# Patient Record
Sex: MALE | Race: WHITE | NOT HISPANIC OR LATINO | URBAN - METROPOLITAN AREA
[De-identification: names, ages, dates, MRNs, and addresses within clinical notes are randomized per-mention and may not be internally consistent; named-entity substitution may affect disease eponyms.]

---

## 2020-01-14 ENCOUNTER — OFFICE (OUTPATIENT)
Dept: URBAN - METROPOLITAN AREA CLINIC 33 | Facility: CLINIC | Age: 30
End: 2020-01-14

## 2020-01-14 VITALS
HEART RATE: 62 BPM | DIASTOLIC BLOOD PRESSURE: 75 MMHG | SYSTOLIC BLOOD PRESSURE: 117 MMHG | WEIGHT: 153 LBS | TEMPERATURE: 97.3 F | HEIGHT: 75 IN

## 2020-01-14 DIAGNOSIS — R63.4 ABNORMAL WEIGHT LOSS: ICD-10-CM

## 2020-01-14 DIAGNOSIS — R11.0 NAUSEA: ICD-10-CM

## 2020-01-14 DIAGNOSIS — R10.13 EPIGASTRIC PAIN: ICD-10-CM

## 2020-01-14 PROCEDURE — 99204 OFFICE O/P NEW MOD 45 MIN: CPT

## 2020-01-14 RX ORDER — PANTOPRAZOLE SODIUM 40 MG/1
TABLET, DELAYED RELEASE ORAL
Qty: 30 | Refills: 6 | Status: COMPLETED
End: 2020-08-03

## 2020-01-14 NOTE — SERVICEHPINOTES
FOREIGN MORALES   is a   29   year old male who is being seen in consultation at the request of   ARAVIND MATHEW   for nausea, weight loss, and intermittent abdominal cramping. He states symptoms are going on for past few years since 2015/2016. He has lost about 30 lbs since. He gets nausea if he eats too much and it will usually subside in an hour or two. The cramping is intermittent/rare. No abdominal pain with greasy food. He denies any vomiting. He sometimes will have diarrhea when he has nausea. He notes constipation at times but he is not sure if it related to reduced po intake at times. He notes heartburn at times and takes pepto bismol for symptomatic relief. He has tried PPI for 2 weeks and he was feeling better while on that. He denies any dysphagia. He has had dark stools associated with pepto intake. He denies any bright red blood per rectum. He has had endoscopic workup in the past for epigastric pain and nausea. He has a history of gastric erosions.  Of note, he had a CT Abdomen with Dr. Mathew which he states was unremarkable.

## 2020-01-21 ENCOUNTER — OFFICE (OUTPATIENT)
Dept: URBAN - METROPOLITAN AREA CLINIC 32 | Facility: CLINIC | Age: 30
End: 2020-01-21
Payer: COMMERCIAL

## 2020-01-21 VITALS
HEIGHT: 75 IN | SYSTOLIC BLOOD PRESSURE: 89 MMHG | RESPIRATION RATE: 16 BRPM | TEMPERATURE: 97.9 F | HEART RATE: 77 BPM | SYSTOLIC BLOOD PRESSURE: 95 MMHG | OXYGEN SATURATION: 98 % | DIASTOLIC BLOOD PRESSURE: 64 MMHG | DIASTOLIC BLOOD PRESSURE: 69 MMHG | OXYGEN SATURATION: 99 % | HEART RATE: 71 BPM | SYSTOLIC BLOOD PRESSURE: 102 MMHG | RESPIRATION RATE: 10 BRPM | HEART RATE: 64 BPM | HEART RATE: 73 BPM | SYSTOLIC BLOOD PRESSURE: 98 MMHG | OXYGEN SATURATION: 100 % | DIASTOLIC BLOOD PRESSURE: 57 MMHG | SYSTOLIC BLOOD PRESSURE: 87 MMHG | WEIGHT: 153 LBS | DIASTOLIC BLOOD PRESSURE: 51 MMHG | TEMPERATURE: 98.1 F | DIASTOLIC BLOOD PRESSURE: 55 MMHG

## 2020-01-21 DIAGNOSIS — R10.13 EPIGASTRIC PAIN: ICD-10-CM

## 2020-01-21 DIAGNOSIS — T18.2XXA FOREIGN BODY IN STOMACH, INITIAL ENCOUNTER: ICD-10-CM

## 2020-01-21 DIAGNOSIS — R63.4 ABNORMAL WEIGHT LOSS: ICD-10-CM

## 2020-01-21 DIAGNOSIS — K29.60 OTHER GASTRITIS WITHOUT BLEEDING: ICD-10-CM

## 2020-01-21 DIAGNOSIS — R11.0 NAUSEA: ICD-10-CM

## 2020-01-21 PROBLEM — K29.70 GASTRITIS, UNSPECIFIED, WITHOUT BLEEDING: Status: ACTIVE | Noted: 2020-01-21

## 2020-01-21 LAB
GI UPPER EGD HISOLOGY - SPECM 1 JAR(S): 4: (no result)
GI UPPER EGD HISOLOGY - SPECM 1 JAR(S): 4: PDF REPORT: (no result)

## 2020-01-21 PROCEDURE — 43239 EGD BIOPSY SINGLE/MULTIPLE: CPT

## 2020-06-19 ENCOUNTER — OFFICE (OUTPATIENT)
Dept: URBAN - METROPOLITAN AREA TELEHEALTH 3 | Facility: TELEHEALTH | Age: 30
End: 2020-06-19
Payer: COMMERCIAL

## 2020-06-19 VITALS — WEIGHT: 158 LBS | HEIGHT: 75 IN

## 2020-06-19 DIAGNOSIS — R11.0 NAUSEA: ICD-10-CM

## 2020-06-19 DIAGNOSIS — R10.9 UNSPECIFIED ABDOMINAL PAIN: ICD-10-CM

## 2020-06-19 DIAGNOSIS — R63.4 ABNORMAL WEIGHT LOSS: ICD-10-CM

## 2020-06-19 PROCEDURE — 99213 OFFICE O/P EST LOW 20 MIN: CPT | Mod: 95 | Performed by: INTERNAL MEDICINE

## 2020-06-19 NOTE — SERVICEHPINOTES
PATIENT VERIFIED BY DATE OF BIRTH AND NAME. Patient has been consented for this telecommunication visit. He was started on PPI and has been doing well since. Nausea has gone away for the most part. He has regained about 8 lbs since his lowest point (previous weight was 185 then as low as 150 lbs, now up to 158 lbs).  He has not had any abdominal pain. No vomiting, melena, rectal bleeding, diarrhea. Notes occasional constipation. His EGD revealed gastritis and food in the stomach. Follow up gastric emptying study was unremarkable.  Overall he has been doing better. Starting to lift weights again and is trying to eat more, at least 1800 to 2400 calories. He is wondering how for how long he has to take PPI.

## 2020-08-03 ENCOUNTER — OFFICE (OUTPATIENT)
Dept: URBAN - METROPOLITAN AREA TELEHEALTH 12 | Facility: TELEHEALTH | Age: 30
End: 2020-08-03
Payer: COMMERCIAL

## 2020-08-03 VITALS — HEIGHT: 75 IN | WEIGHT: 158 LBS

## 2020-08-03 DIAGNOSIS — R11.0 NAUSEA: ICD-10-CM

## 2020-08-03 DIAGNOSIS — K30 FUNCTIONAL DYSPEPSIA: ICD-10-CM

## 2020-08-03 DIAGNOSIS — R63.4 ABNORMAL WEIGHT LOSS: ICD-10-CM

## 2020-08-03 PROCEDURE — 99213 OFFICE O/P EST LOW 20 MIN: CPT | Mod: 95 | Performed by: INTERNAL MEDICINE

## 2020-08-03 NOTE — SERVICEHPINOTES
PATIENT VERIFIED BY DATE OF BIRTH AND NAME. Patient has been consented for this telecommunication visit. Patient doing well. No major complaints. Still having occasional nausea but he has been able to wean off of PPI. He has noted some belching since he stopped PPI but no other major issues. He denies any abdominal pain. His appetite is still not great and he does note that he sometimes feels full quickly which will then trigger his nausea.  He has responded to [facility] before. He denies any abdominal pain, vomiting, diarrhea, constipation, melena, rectal bleeding, dysphagia, further weight loss. 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 No compression/narrowing of bowel was seen.  ROS as above, otherwise remainder of ROS is negative.

## 2020-08-03 NOTE — SERVICENOTES
Patient's visit was conducted through video telecommunication. Patient consented before the start of visit as to understanding of privacy concerns, possible technological failure, and their responsibility of carrying out instructions of plan.

Patient understands and agrees with plan. Questions concerns addressed